# Patient Record
Sex: MALE | Race: WHITE | URBAN - METROPOLITAN AREA
[De-identification: names, ages, dates, MRNs, and addresses within clinical notes are randomized per-mention and may not be internally consistent; named-entity substitution may affect disease eponyms.]

---

## 2022-04-14 ENCOUNTER — APPOINTMENT (OUTPATIENT)
Dept: CT IMAGING | Age: 32
End: 2022-04-14
Payer: MEDICAID

## 2022-04-14 ENCOUNTER — HOSPITAL ENCOUNTER (EMERGENCY)
Age: 32
Discharge: HOME | End: 2022-04-14
Attending: EMERGENCY MEDICINE
Payer: MEDICAID

## 2022-04-14 ENCOUNTER — APPOINTMENT (OUTPATIENT)
Dept: GENERAL RADIOLOGY | Age: 32
End: 2022-04-14
Payer: MEDICAID

## 2022-04-14 VITALS
HEART RATE: 105 BPM | OXYGEN SATURATION: 94 % | RESPIRATION RATE: 18 BRPM | SYSTOLIC BLOOD PRESSURE: 146 MMHG | DIASTOLIC BLOOD PRESSURE: 108 MMHG | TEMPERATURE: 98.3 F

## 2022-04-14 DIAGNOSIS — S09.90XA INJURY OF HEAD, INITIAL ENCOUNTER: Primary | ICD-10-CM

## 2022-04-14 PROCEDURE — 73130 X-RAY EXAM OF HAND: CPT

## 2022-04-14 PROCEDURE — 99284 EMERGENCY DEPT VISIT MOD MDM: CPT

## 2022-04-14 PROCEDURE — 72128 CT CHEST SPINE W/O DYE: CPT

## 2022-04-14 PROCEDURE — 12004 RPR S/N/AX/GEN/TRK7.6-12.5CM: CPT

## 2022-04-14 PROCEDURE — 70450 CT HEAD/BRAIN W/O DYE: CPT

## 2022-04-14 PROCEDURE — 72125 CT NECK SPINE W/O DYE: CPT

## 2022-04-14 PROCEDURE — 70486 CT MAXILLOFACIAL W/O DYE: CPT

## 2022-04-14 PROCEDURE — 72131 CT LUMBAR SPINE W/O DYE: CPT
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
## 2022-04-14 NOTE — ED NOTES
Patient requesting phone to call his mother. Patient called his mother to inform her that he was in the ED, left message.      Namrata Falcon RN  04/14/22 2918
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
